# Patient Record
Sex: FEMALE | Race: WHITE | NOT HISPANIC OR LATINO | Employment: FULL TIME | URBAN - METROPOLITAN AREA
[De-identification: names, ages, dates, MRNs, and addresses within clinical notes are randomized per-mention and may not be internally consistent; named-entity substitution may affect disease eponyms.]

---

## 2024-09-28 ENCOUNTER — OFFICE VISIT (OUTPATIENT)
Dept: URGENT CARE | Facility: URGENT CARE | Age: 24
End: 2024-09-28
Payer: COMMERCIAL

## 2024-09-28 VITALS
DIASTOLIC BLOOD PRESSURE: 79 MMHG | OXYGEN SATURATION: 95 % | TEMPERATURE: 99.2 F | SYSTOLIC BLOOD PRESSURE: 117 MMHG | RESPIRATION RATE: 24 BRPM | HEART RATE: 82 BPM | WEIGHT: 193.2 LBS

## 2024-09-28 DIAGNOSIS — H53.8 BLURRED VISION, BILATERAL: Primary | ICD-10-CM

## 2024-09-28 PROCEDURE — 99203 OFFICE O/P NEW LOW 30 MIN: CPT | Performed by: PREVENTIVE MEDICINE

## 2024-09-28 RX ORDER — DROSPIRENONE AND ETHINYL ESTRADIOL 0.02-3(28)
1 KIT ORAL
COMMUNITY
Start: 2024-06-24

## 2024-09-28 RX ORDER — ACETAMINOPHEN AND CODEINE PHOSPHATE 120; 12 MG/5ML; MG/5ML
1 SOLUTION ORAL DAILY
COMMUNITY
Start: 2024-09-23 | End: 2025-09-23

## 2024-09-28 RX ORDER — OXYCODONE HYDROCHLORIDE 5 MG/1
1 TABLET ORAL EVERY 4 HOURS PRN
COMMUNITY
Start: 2024-09-26

## 2024-09-28 RX ORDER — ONDANSETRON 4 MG/1
TABLET, FILM COATED ORAL
COMMUNITY
Start: 2023-04-04

## 2024-09-28 RX ORDER — NARATRIPTAN 2.5 MG/1
TABLET ORAL
COMMUNITY
Start: 2023-04-04

## 2024-09-28 RX ORDER — TRETINOIN 0.25 MG/G
CREAM TOPICAL
COMMUNITY
Start: 2024-07-18

## 2024-09-28 RX ORDER — NAPROXEN SODIUM 220 MG
220 TABLET ORAL
COMMUNITY

## 2024-09-28 NOTE — PROGRESS NOTES
Assessment & Plan     Blurred vision, bilateral  -distance vision is normal  -symptoms are only for near vision, equal in both eyes  -no loss of vision or focal symptoms  -neurologic exam is normal.  -ED precautions in detail: if increased symptoms, loss of vision, headaches, then needs to be seen in the emergency room, patient expressed comprehension of this.   -work note provided  -family member will be driving patient  -differentials considered include medication side effects: mydriasis from scopolamine, oxycodone side effects.   -urgent referral to EYE placed.   - Adult Eye  Referral     25 minutes spent by me on the date of the encounter doing chart review, history and exam, documentation and further activities per the note      I ended our visit today by discussing the patient's diagnoses and recommended treatment. Please refer to today's diagnoses and orders for further details. I briefly discussed the pathophysiology of these conditions and outlined their expected course. I discussed the warning symptoms and signs that indicate an atypical course that would need urgent or emergent care. Common side effects of medications prescribed at this visit were discussed with the patient. Severe side effects, including current applicable black box warnings, were discussed.           Return if symptoms worsen or fail to improve.    Tricia Billy MD MPH  [facility] WAQAREARLE Sultana is a 23 year old female who presents to clinic today for the following health issues:  Chief Complaint   Patient presents with    Urgent Care     ON THURSDAY PT     Eye Problem     ONSET LAST NIGHT, BLURRED VISION PT HAD JUST TAKEN OXYCODONE THOUGHT IT WAS NORMAL, STILL HAVING BLURRED VISION THIS MORNING UP CLOSE AND FURTHER AWAY, CONSTANT SINCE LAST NIGHT. ALSO HAVING VAGINAL BLEEDING FROM IUD PLACEMENT ON THURSDAY.     Vaginal Bleeding          No data to display               HPI      Gallbladder surgery and IUD placement on 9/26/24. Last night started having blurred vision from both eyes. Not sure if it was gradual onset or not. Denies headache. No diagnosed high blood pressure history.     Tylenol and Advil being taken  Has been a little loopy  This morning could not see her phone again  No loss of vision  No flashes of light  No history of glaucoma  No eye pain  No redness of the eyes   No headaches  No fever  No chills  No emesis  No focal weakness or paresthesias  Feels weak in general since surgery   NO falls  No Loss of consciousness  No pain with urination  Some vaginal spotting, just using a liner.  Does have glasses for astigmatism but has not been wearing her glasses and not see EYE in some time.  Last Oxycodone 1045 PM last night   No watering from the eyes  Took off Scopolamine patch this morning.   NO eye pain    Distance vision is fine, it is the near vision that is blurred.         Review of Systems  Constitutional, HEENT, cardiovascular, pulmonary, gi and gu systems are negative, except as otherwise noted.      Objective    /79 (BP Location: Left arm, Patient Position: Sitting, Cuff Size: Adult Large)   Pulse 82   Temp 99.2  F (37.3  C) (Tympanic)   Resp 24   Wt 87.6 kg (193 lb 3.2 oz)   SpO2 95%   Physical Exam   GENERAL APPEARANCE: healthy, alert and no distress  EYES: Eyes grossly normal to inspection and conjunctivae and sclerae normal, EULALIO+ EOM intact   NECK: no adenopathy and trachea midline and normal to palpation  RESP: lungs clear to auscultation - no rales, rhonchi or wheezes  CV: regular rates and rhythm, normal S1 S2, no S3 or S4 and no murmur, click or rub  ABDOMEN: soft, non-tender and no rebound or guarding   MS: extremities normal- no gross deformities noted and peripheral pulses normal  SKIN: no suspicious lesions or rashes  NEURO: Normal strength and tone, mentation intact and speech normal, normal gait, Romberg negative, Cranial nerves  intact.  PSYCH: mentation appears normal      No results found for this or any previous visit (from the past 24 hour(s)).

## 2024-09-28 NOTE — LETTER
September 28, 2024      Katelyn Elizabeth Schwab  [address] [address] [facility] [id number]        To Whom It May Concern:    Katelyn Elizabeth Schwab was seen in Urgent care on 9/28/24. Please excuse her from work till 10/1/24. Please let me know if nay questions.       Sincerely,        Tricia Billy MD MPH